# Patient Record
Sex: FEMALE | Race: WHITE | Employment: FULL TIME | URBAN - NONMETROPOLITAN AREA
[De-identification: names, ages, dates, MRNs, and addresses within clinical notes are randomized per-mention and may not be internally consistent; named-entity substitution may affect disease eponyms.]

---

## 2023-08-13 SDOH — HEALTH STABILITY: PHYSICAL HEALTH: ON AVERAGE, HOW MANY DAYS PER WEEK DO YOU ENGAGE IN MODERATE TO STRENUOUS EXERCISE (LIKE A BRISK WALK)?: 0 DAYS

## 2023-08-13 SDOH — HEALTH STABILITY: PHYSICAL HEALTH: ON AVERAGE, HOW MANY MINUTES DO YOU ENGAGE IN EXERCISE AT THIS LEVEL?: 0 MIN

## 2023-08-14 ENCOUNTER — OFFICE VISIT (OUTPATIENT)
Dept: FAMILY MEDICINE CLINIC | Age: 58
End: 2023-08-14
Payer: COMMERCIAL

## 2023-08-14 VITALS
OXYGEN SATURATION: 96 % | HEIGHT: 68 IN | HEART RATE: 86 BPM | SYSTOLIC BLOOD PRESSURE: 96 MMHG | BODY MASS INDEX: 24.16 KG/M2 | WEIGHT: 159.4 LBS | DIASTOLIC BLOOD PRESSURE: 64 MMHG

## 2023-08-14 DIAGNOSIS — Z76.89 ENCOUNTER TO ESTABLISH CARE: ICD-10-CM

## 2023-08-14 DIAGNOSIS — J30.9 ALLERGIC RHINITIS, UNSPECIFIED SEASONALITY, UNSPECIFIED TRIGGER: ICD-10-CM

## 2023-08-14 DIAGNOSIS — Z13.220 SCREENING FOR HYPERLIPIDEMIA: ICD-10-CM

## 2023-08-14 DIAGNOSIS — Z00.00 ENCOUNTER FOR WELLNESS EXAMINATION IN ADULT: Primary | ICD-10-CM

## 2023-08-14 DIAGNOSIS — Z12.31 ENCOUNTER FOR SCREENING MAMMOGRAM FOR BREAST CANCER: ICD-10-CM

## 2023-08-14 PROCEDURE — 3017F COLORECTAL CA SCREEN DOC REV: CPT | Performed by: NURSE PRACTITIONER

## 2023-08-14 PROCEDURE — G8420 CALC BMI NORM PARAMETERS: HCPCS | Performed by: NURSE PRACTITIONER

## 2023-08-14 PROCEDURE — G8427 DOCREV CUR MEDS BY ELIG CLIN: HCPCS | Performed by: NURSE PRACTITIONER

## 2023-08-14 PROCEDURE — 99204 OFFICE O/P NEW MOD 45 MIN: CPT | Performed by: NURSE PRACTITIONER

## 2023-08-14 PROCEDURE — 1036F TOBACCO NON-USER: CPT | Performed by: NURSE PRACTITIONER

## 2023-08-14 RX ORDER — DIPHENHYDRAMINE HCL 25 MG
CAPSULE ORAL
COMMUNITY

## 2023-08-14 RX ORDER — CLOBETASOL PROPIONATE 0.5 MG/G
CREAM TOPICAL 2 TIMES DAILY
COMMUNITY
Start: 2021-11-26

## 2023-08-14 RX ORDER — LORATADINE 10 MG/1
10 TABLET ORAL DAILY
COMMUNITY

## 2023-08-14 RX ORDER — PHENOL 1.4 %
10 AEROSOL, SPRAY (ML) MUCOUS MEMBRANE NIGHTLY
COMMUNITY

## 2023-08-14 SDOH — ECONOMIC STABILITY: HOUSING INSECURITY
IN THE LAST 12 MONTHS, WAS THERE A TIME WHEN YOU DID NOT HAVE A STEADY PLACE TO SLEEP OR SLEPT IN A SHELTER (INCLUDING NOW)?: NO

## 2023-08-14 SDOH — ECONOMIC STABILITY: FOOD INSECURITY: WITHIN THE PAST 12 MONTHS, THE FOOD YOU BOUGHT JUST DIDN'T LAST AND YOU DIDN'T HAVE MONEY TO GET MORE.: NEVER TRUE

## 2023-08-14 SDOH — ECONOMIC STABILITY: INCOME INSECURITY: HOW HARD IS IT FOR YOU TO PAY FOR THE VERY BASICS LIKE FOOD, HOUSING, MEDICAL CARE, AND HEATING?: NOT HARD AT ALL

## 2023-08-14 SDOH — ECONOMIC STABILITY: FOOD INSECURITY: WITHIN THE PAST 12 MONTHS, YOU WORRIED THAT YOUR FOOD WOULD RUN OUT BEFORE YOU GOT MONEY TO BUY MORE.: NEVER TRUE

## 2023-08-14 ASSESSMENT — ENCOUNTER SYMPTOMS
ABDOMINAL PAIN: 0
COUGH: 0
WHEEZING: 0
DIARRHEA: 0
CONSTIPATION: 0
SHORTNESS OF BREATH: 0
NAUSEA: 0
EYES NEGATIVE: 1

## 2023-08-14 ASSESSMENT — PATIENT HEALTH QUESTIONNAIRE - PHQ9
SUM OF ALL RESPONSES TO PHQ QUESTIONS 1-9: 0
2. FEELING DOWN, DEPRESSED OR HOPELESS: 0
SUM OF ALL RESPONSES TO PHQ QUESTIONS 1-9: 0
1. LITTLE INTEREST OR PLEASURE IN DOING THINGS: 0
SUM OF ALL RESPONSES TO PHQ QUESTIONS 1-9: 0
SUM OF ALL RESPONSES TO PHQ QUESTIONS 1-9: 0
SUM OF ALL RESPONSES TO PHQ9 QUESTIONS 1 & 2: 0

## 2023-08-14 NOTE — PROGRESS NOTES
[address]  [address], [address], [address]  Dept: [phone]  Dept Fax: [phone]    Date of Service:  8/14/2023    Apryl Merritt is a 66 y.o. female who presents in office today with Self. Chief Complaint   Patient presents with    New Patient     Former pt of Alonso Canales        Diagnoses / Plan:   1. Encounter for wellness examination in adult  -     CBC with Auto Differential; Future  -     Lipid, Fasting; Future  -     Comprehensive Metabolic Panel; Future  -     HUBER DIGITAL SCREEN W OR WO CAD BILATERAL; Future  2. Encounter to establish care  3. Allergic rhinitis, unspecified seasonality, unspecified trigger  Assessment & Plan:   Well-controlled, continue current medications. Doing well with Claritin. Orders:  -     CBC with Auto Differential; Future  -     Comprehensive Metabolic Panel; Future  4. Screening for hyperlipidemia  -     Lipid, Fasting; Future  -     Comprehensive Metabolic Panel; Future  5. Encounter for screening mammogram for breast cancer  -     HUBER DIGITAL SCREEN W OR WO CAD BILATERAL; Future       Encouraged healthy diet and routine exercise. Instructed to continue current medications. All patient questions answered. Patient voiced understanding. Return if symptoms worsen or fail to improve. Subjective:   History of Present Illness:  Presents to establish care. She works full time at [employer]. She is a non smoker, does not exercise, no prescription medication. Blood pressure usually runs low. She denies fatigue, lightheadedness or dizziness. Completes monthly self breast exams. Reports having multiple mammograms and should be up to date. Colonoscopy completed at [facility]. Has pain to the right shoulder, follows with orthopedic surgery (Dr. Oswaldo Ricci) in [place].         BP Readings from Last 3 Encounters:   08/14/23 96/64       Wt Readings from Last 3 Encounters:   08/14/23 159 lb 6.4 oz (72.3 kg)        The ASCVD Risk

## 2023-08-18 ENCOUNTER — TELEPHONE (OUTPATIENT)
Age: 58
End: 2023-08-18

## 2023-08-18 DIAGNOSIS — Z01.818 PRE-OP EXAM: Primary | ICD-10-CM

## 2023-08-18 NOTE — TELEPHONE ENCOUNTER
Lisa Shabazz has been scheduled for a right shoulder scope rotator cuff repair and possible subscapularis repair on 9/6. Her PCP ordered some of the bloodwork we need so I just added an EKG. I gave her the instructions when she was in the office yesterday, she can do this as a walk in. Consent has been signed.

## 2023-08-23 ENCOUNTER — HOSPITAL ENCOUNTER (OUTPATIENT)
Age: 58
Discharge: HOME | End: 2023-08-23
Payer: COMMERCIAL

## 2023-08-23 PROCEDURE — 93005 ELECTROCARDIOGRAM TRACING: CPT | Performed by: ORTHOPAEDIC SURGERY

## 2023-08-24 LAB
EKG ATRIAL RATE: 62 BPM
EKG P AXIS: 54 DEGREES
EKG P-R INTERVAL: 122 MS
EKG Q-T INTERVAL: 428 MS
EKG QRS DURATION: 76 MS
EKG QTC CALCULATION (BAZETT): 434 MS
EKG R AXIS: 9 DEGREES
EKG T AXIS: 43 DEGREES
EKG VENTRICULAR RATE: 62 BPM

## 2023-08-29 ENCOUNTER — HOSPITAL ENCOUNTER (OUTPATIENT)
Age: 58
Discharge: HOME | End: 2023-08-29
Payer: COMMERCIAL

## 2023-08-29 ENCOUNTER — HOSPITAL ENCOUNTER (OUTPATIENT)
Dept: MAMMOGRAPHY | Age: 58
Discharge: HOME | End: 2023-08-31
Payer: COMMERCIAL

## 2023-08-29 VITALS — BODY MASS INDEX: 24.96 KG/M2 | HEIGHT: 67 IN | WEIGHT: 159 LBS

## 2023-08-29 DIAGNOSIS — Z00.00 ENCOUNTER FOR WELLNESS EXAMINATION IN ADULT: ICD-10-CM

## 2023-08-29 DIAGNOSIS — Z12.31 ENCOUNTER FOR SCREENING MAMMOGRAM FOR BREAST CANCER: ICD-10-CM

## 2023-08-29 DIAGNOSIS — J30.9 ALLERGIC RHINITIS, UNSPECIFIED SEASONALITY, UNSPECIFIED TRIGGER: ICD-10-CM

## 2023-08-29 DIAGNOSIS — Z13.220 SCREENING FOR HYPERLIPIDEMIA: ICD-10-CM

## 2023-08-29 LAB
ALBUMIN SERPL-MCNC: 4.4 G/DL (ref 3.5–5.2)
ALBUMIN/GLOB SERPL: 1.5 {RATIO} (ref 1–2.5)
ALP SERPL-CCNC: 93 U/L (ref 35–104)
ALT SERPL-CCNC: 15 U/L (ref 5–33)
ANION GAP SERPL CALCULATED.3IONS-SCNC: 11 MMOL/L (ref 9–17)
AST SERPL-CCNC: 21 U/L
BASOPHILS # BLD: 0.05 K/UL (ref 0–0.2)
BASOPHILS NFR BLD: 1 % (ref 0–2)
BILIRUB SERPL-MCNC: 0.4 MG/DL (ref 0.3–1.2)
BUN SERPL-MCNC: 18 MG/DL (ref 6–20)
CALCIUM SERPL-MCNC: 10.6 MG/DL (ref 8.6–10.4)
CHLORIDE SERPL-SCNC: 105 MMOL/L (ref 98–107)
CHOLEST SERPL-MCNC: 212 MG/DL
CHOLESTEROL/HDL RATIO: 3.4
CO2 SERPL-SCNC: 25 MMOL/L (ref 20–31)
CREAT SERPL-MCNC: 1 MG/DL (ref 0.5–0.9)
EOSINOPHIL # BLD: 0.46 K/UL (ref 0–0.44)
EOSINOPHILS RELATIVE PERCENT: 6 % (ref 1–4)
ERYTHROCYTE [DISTWIDTH] IN BLOOD BY AUTOMATED COUNT: 12.1 % (ref 11.8–14.4)
GFR SERPL CREATININE-BSD FRML MDRD: >60 ML/MIN/1.73M2
GLUCOSE SERPL-MCNC: 93 MG/DL (ref 70–99)
HCT VFR BLD AUTO: 45.1 % (ref 36.3–47.1)
HDLC SERPL-MCNC: 63 MG/DL
HGB BLD-MCNC: 14.5 G/DL (ref 11.9–15.1)
IMM GRANULOCYTES # BLD AUTO: <0.03 K/UL (ref 0–0.3)
IMM GRANULOCYTES NFR BLD: 0 %
LDLC SERPL CALC-MCNC: 121 MG/DL (ref 0–130)
LYMPHOCYTES NFR BLD: 1.84 K/UL (ref 1.1–3.7)
LYMPHOCYTES RELATIVE PERCENT: 25 % (ref 24–43)
MCH RBC QN AUTO: 30.4 PG (ref 25.2–33.5)
MCHC RBC AUTO-ENTMCNC: 32.2 G/DL (ref 28.4–34.8)
MCV RBC AUTO: 94.5 FL (ref 82.6–102.9)
MONOCYTES NFR BLD: 0.52 K/UL (ref 0.1–1.2)
MONOCYTES NFR BLD: 7 % (ref 3–12)
NEUTROPHILS NFR BLD: 61 % (ref 36–65)
NEUTS SEG NFR BLD: 4.36 K/UL (ref 1.5–8.1)
NRBC BLD-RTO: 0 PER 100 WBC
PLATELET # BLD AUTO: 286 K/UL (ref 138–453)
PMV BLD AUTO: 10.4 FL (ref 8.1–13.5)
POTASSIUM SERPL-SCNC: 4.2 MMOL/L (ref 3.7–5.3)
PROT SERPL-MCNC: 7.3 G/DL (ref 6.4–8.3)
RBC # BLD AUTO: 4.77 M/UL (ref 3.95–5.11)
SODIUM SERPL-SCNC: 141 MMOL/L (ref 135–144)
TRIGL SERPL-MCNC: 138 MG/DL
WBC OTHER # BLD: 7.2 K/UL (ref 3.5–11.3)

## 2023-08-29 PROCEDURE — 77063 BREAST TOMOSYNTHESIS BI: CPT

## 2023-08-29 PROCEDURE — 80053 COMPREHEN METABOLIC PANEL: CPT

## 2023-08-29 PROCEDURE — 85025 COMPLETE CBC W/AUTO DIFF WBC: CPT

## 2023-08-29 PROCEDURE — 36415 COLL VENOUS BLD VENIPUNCTURE: CPT

## 2023-08-29 PROCEDURE — 80061 LIPID PANEL: CPT

## 2023-08-29 NOTE — PROGRESS NOTES
DAY OF SURGERY/PROCEDURE  GUIDELINES    As a patient at the [facility], you can expect quality medical and nursing care that is centered on your individual needs. It is our goal to make your surgical experience as comfortable and excellent as possible.  ________________________________________________________________________    The following instructions are general guidelines, if any information on this sheet is different from what your doctor has instructed you to do, please follow your doctor's instructions. Please arrive on 09/06/2023 @  0715      Enter through entrance C. Check in at registration     Upon arrival you will be taken to the pre-operative area to get ready for surgery, your family will stay in the waiting room and visit with you once you are ready for surgery. Due to special limitations please limit visitation to 1-2 members of your family at a time. When it is time for surgery your family will return to the waiting room. Nothing to eat, drink, smoke, suck or chew after midnight (no water, gum, mints, cigarettes, cigars, pipes, snuff, chewing tobacco, etc.) or your surgery may be canceled. Take a shower or bath on the morning of your surgery/procedure (Hibiclens if directed) Do not apply any lotions. Brush your teeth, but do not swallow any water    IN CASE OF ILLNESS - If you have a cold or flu symptoms (high fever, runny nose, sore throat, cough, etc.) rash, nausea, vomiting, loose stools, and/or recent contact with someone who has a contagious disease (chick pox, measles, etc.) please call your doctor before coming to the surgery center    Take a small sip of water with heart, blood pressure, and/or seizure medication the morning of surgery.      If applicable bring your:  Inhaler (s)  Hearing aid(s)  Eyeglasses and Case (If you wear contacts they have to be removed before surgery, bring case and solution)  CPAP     DO NOT take anticoagulants (blood thinners,

## 2023-08-30 DIAGNOSIS — E83.52 HYPERCALCEMIA: Primary | ICD-10-CM

## 2023-08-30 NOTE — RESULT ENCOUNTER NOTE
Your recent mammogram result is normal.  Please call the office or message through [address] [place] [address] with any additional questions or concerns.

## 2023-09-05 ENCOUNTER — ANESTHESIA EVENT (OUTPATIENT)
Dept: OPERATING ROOM | Age: 58
End: 2023-09-05
Payer: COMMERCIAL

## 2023-09-06 ENCOUNTER — HOSPITAL ENCOUNTER (OUTPATIENT)
Age: 58
Setting detail: OUTPATIENT SURGERY
Discharge: HOME | End: 2023-09-06
Attending: ORTHOPAEDIC SURGERY | Admitting: ORTHOPAEDIC SURGERY
Payer: COMMERCIAL

## 2023-09-06 ENCOUNTER — ANESTHESIA (OUTPATIENT)
Dept: OPERATING ROOM | Age: 58
End: 2023-09-06
Payer: COMMERCIAL

## 2023-09-06 VITALS
HEART RATE: 83 BPM | HEIGHT: 68 IN | TEMPERATURE: 96.4 F | RESPIRATION RATE: 16 BRPM | BODY MASS INDEX: 23.76 KG/M2 | OXYGEN SATURATION: 95 % | WEIGHT: 156.8 LBS | SYSTOLIC BLOOD PRESSURE: 111 MMHG | DIASTOLIC BLOOD PRESSURE: 68 MMHG

## 2023-09-06 DIAGNOSIS — G89.18 POST-OP PAIN: Primary | ICD-10-CM

## 2023-09-06 PROCEDURE — 6360000002 HC RX W HCPCS: Performed by: NURSE ANESTHETIST, CERTIFIED REGISTERED

## 2023-09-06 PROCEDURE — 6360000002 HC RX W HCPCS: Performed by: ORTHOPAEDIC SURGERY

## 2023-09-06 PROCEDURE — 2580000003 HC RX 258: Performed by: ORTHOPAEDIC SURGERY

## 2023-09-06 PROCEDURE — 7100000000 HC PACU RECOVERY - FIRST 15 MIN: Performed by: ORTHOPAEDIC SURGERY

## 2023-09-06 PROCEDURE — 3700000000 HC ANESTHESIA ATTENDED CARE: Performed by: ORTHOPAEDIC SURGERY

## 2023-09-06 PROCEDURE — 29822 SHO ARTHRS SRG LMTD DBRDMT: CPT | Performed by: ORTHOPAEDIC SURGERY

## 2023-09-06 PROCEDURE — 3600000004 HC SURGERY LEVEL 4 BASE: Performed by: ORTHOPAEDIC SURGERY

## 2023-09-06 PROCEDURE — 2500000003 HC RX 250 WO HCPCS: Performed by: NURSE ANESTHETIST, CERTIFIED REGISTERED

## 2023-09-06 PROCEDURE — 2580000003 HC RX 258: Performed by: ANESTHESIOLOGY

## 2023-09-06 PROCEDURE — 3700000001 HC ADD 15 MINUTES (ANESTHESIA): Performed by: ORTHOPAEDIC SURGERY

## 2023-09-06 PROCEDURE — 7100000001 HC PACU RECOVERY - ADDTL 15 MIN: Performed by: ORTHOPAEDIC SURGERY

## 2023-09-06 PROCEDURE — 6370000000 HC RX 637 (ALT 250 FOR IP)

## 2023-09-06 PROCEDURE — C9290 INJ, BUPIVACAINE LIPOSOME: HCPCS | Performed by: ANESTHESIOLOGY

## 2023-09-06 PROCEDURE — C1713 ANCHOR/SCREW BN/BN,TIS/BN: HCPCS | Performed by: ORTHOPAEDIC SURGERY

## 2023-09-06 PROCEDURE — L3650 SO 8 ABD RESTRAINT PRE OTS: HCPCS | Performed by: ORTHOPAEDIC SURGERY

## 2023-09-06 PROCEDURE — 64415 NJX AA&/STRD BRCH PLXS IMG: CPT | Performed by: ANESTHESIOLOGY

## 2023-09-06 PROCEDURE — C9399 UNCLASSIFIED DRUGS OR BIOLOG: HCPCS

## 2023-09-06 PROCEDURE — 7100000011 HC PHASE II RECOVERY - ADDTL 15 MIN: Performed by: ORTHOPAEDIC SURGERY

## 2023-09-06 PROCEDURE — 6360000002 HC RX W HCPCS

## 2023-09-06 PROCEDURE — 2720000010 HC SURG SUPPLY STERILE: Performed by: ORTHOPAEDIC SURGERY

## 2023-09-06 PROCEDURE — 29827 SHO ARTHRS SRG RT8TR CUF RPR: CPT | Performed by: ORTHOPAEDIC SURGERY

## 2023-09-06 PROCEDURE — 2709999900 HC NON-CHARGEABLE SUPPLY: Performed by: ORTHOPAEDIC SURGERY

## 2023-09-06 PROCEDURE — 7100000010 HC PHASE II RECOVERY - FIRST 15 MIN: Performed by: ORTHOPAEDIC SURGERY

## 2023-09-06 PROCEDURE — 6360000002 HC RX W HCPCS: Performed by: ANESTHESIOLOGY

## 2023-09-06 PROCEDURE — 3600000014 HC SURGERY LEVEL 4 ADDTL 15MIN: Performed by: ORTHOPAEDIC SURGERY

## 2023-09-06 DEVICE — ANCHOR SUT L24.5MM DIA4.75MM BIOCOMPOSITE SELF PUNCHING: Type: IMPLANTABLE DEVICE | Site: SHOULDER | Status: FUNCTIONAL

## 2023-09-06 RX ORDER — ONDANSETRON 2 MG/ML
INJECTION INTRAMUSCULAR; INTRAVENOUS PRN
Status: DISCONTINUED | OUTPATIENT
Start: 2023-09-06 | End: 2023-09-06 | Stop reason: SDUPTHER

## 2023-09-06 RX ORDER — BUPIVACAINE HYDROCHLORIDE 2.5 MG/ML
INJECTION, SOLUTION EPIDURAL; INFILTRATION; INTRACAUDAL
Status: COMPLETED | OUTPATIENT
Start: 2023-09-06 | End: 2023-09-06

## 2023-09-06 RX ORDER — OXYCODONE HYDROCHLORIDE 5 MG/1
10 TABLET ORAL PRN
Status: DISCONTINUED | OUTPATIENT
Start: 2023-09-06 | End: 2023-09-06 | Stop reason: HOSPADM

## 2023-09-06 RX ORDER — SODIUM CHLORIDE 0.9 % (FLUSH) 0.9 %
5-40 SYRINGE (ML) INJECTION PRN
Status: DISCONTINUED | OUTPATIENT
Start: 2023-09-06 | End: 2023-09-06 | Stop reason: HOSPADM

## 2023-09-06 RX ORDER — EPHEDRINE SULFATE/0.9% NACL/PF 50 MG/5 ML
SYRINGE (ML) INTRAVENOUS PRN
Status: DISCONTINUED | OUTPATIENT
Start: 2023-09-06 | End: 2023-09-06 | Stop reason: SDUPTHER

## 2023-09-06 RX ORDER — CEFAZOLIN 2 G/1
INJECTION, POWDER, FOR SOLUTION INTRAMUSCULAR; INTRAVENOUS
Status: DISCONTINUED
Start: 2023-09-06 | End: 2023-09-06 | Stop reason: HOSPADM

## 2023-09-06 RX ORDER — MIDAZOLAM HYDROCHLORIDE 2 MG/2ML
2 INJECTION, SOLUTION INTRAMUSCULAR; INTRAVENOUS
Status: DISCONTINUED | OUTPATIENT
Start: 2023-09-06 | End: 2023-09-06 | Stop reason: HOSPADM

## 2023-09-06 RX ORDER — MEPERIDINE HYDROCHLORIDE 50 MG/ML
12.5 INJECTION INTRAMUSCULAR; INTRAVENOUS; SUBCUTANEOUS ONCE
Status: DISCONTINUED | OUTPATIENT
Start: 2023-09-06 | End: 2023-09-06 | Stop reason: HOSPADM

## 2023-09-06 RX ORDER — SODIUM CHLORIDE 0.9 % (FLUSH) 0.9 %
5-40 SYRINGE (ML) INJECTION EVERY 12 HOURS SCHEDULED
Status: DISCONTINUED | OUTPATIENT
Start: 2023-09-06 | End: 2023-09-06 | Stop reason: HOSPADM

## 2023-09-06 RX ORDER — OXYCODONE HYDROCHLORIDE 5 MG/1
5 TABLET ORAL PRN
Status: DISCONTINUED | OUTPATIENT
Start: 2023-09-06 | End: 2023-09-06 | Stop reason: HOSPADM

## 2023-09-06 RX ORDER — LIDOCAINE HYDROCHLORIDE 10 MG/ML
INJECTION, SOLUTION INFILTRATION; PERINEURAL PRN
Status: DISCONTINUED | OUTPATIENT
Start: 2023-09-06 | End: 2023-09-06 | Stop reason: SDUPTHER

## 2023-09-06 RX ORDER — ROCURONIUM BROMIDE 10 MG/ML
INJECTION, SOLUTION INTRAVENOUS PRN
Status: DISCONTINUED | OUTPATIENT
Start: 2023-09-06 | End: 2023-09-06 | Stop reason: SDUPTHER

## 2023-09-06 RX ORDER — MIDAZOLAM HYDROCHLORIDE 2 MG/2ML
2 INJECTION, SOLUTION INTRAMUSCULAR; INTRAVENOUS ONCE
Status: COMPLETED | OUTPATIENT
Start: 2023-09-06 | End: 2023-09-06

## 2023-09-06 RX ORDER — MIDAZOLAM HYDROCHLORIDE 1 MG/ML
INJECTION INTRAMUSCULAR; INTRAVENOUS
Status: COMPLETED
Start: 2023-09-06 | End: 2023-09-06

## 2023-09-06 RX ORDER — PROPOFOL 10 MG/ML
INJECTION, EMULSION INTRAVENOUS PRN
Status: DISCONTINUED | OUTPATIENT
Start: 2023-09-06 | End: 2023-09-06 | Stop reason: SDUPTHER

## 2023-09-06 RX ORDER — BUPIVACAINE HYDROCHLORIDE 2.5 MG/ML
INJECTION, SOLUTION EPIDURAL; INFILTRATION; INTRACAUDAL
Status: COMPLETED
Start: 2023-09-06 | End: 2023-09-06

## 2023-09-06 RX ORDER — LABETALOL HYDROCHLORIDE 5 MG/ML
10 INJECTION, SOLUTION INTRAVENOUS
Status: DISCONTINUED | OUTPATIENT
Start: 2023-09-06 | End: 2023-09-06 | Stop reason: HOSPADM

## 2023-09-06 RX ORDER — FENTANYL CITRATE 50 UG/ML
INJECTION, SOLUTION INTRAMUSCULAR; INTRAVENOUS PRN
Status: DISCONTINUED | OUTPATIENT
Start: 2023-09-06 | End: 2023-09-06 | Stop reason: SDUPTHER

## 2023-09-06 RX ORDER — DEXAMETHASONE SODIUM PHOSPHATE 10 MG/ML
INJECTION, SOLUTION INTRAMUSCULAR; INTRAVENOUS PRN
Status: DISCONTINUED | OUTPATIENT
Start: 2023-09-06 | End: 2023-09-06 | Stop reason: SDUPTHER

## 2023-09-06 RX ORDER — METOCLOPRAMIDE HYDROCHLORIDE 5 MG/ML
10 INJECTION INTRAMUSCULAR; INTRAVENOUS
Status: DISCONTINUED | OUTPATIENT
Start: 2023-09-06 | End: 2023-09-06 | Stop reason: HOSPADM

## 2023-09-06 RX ORDER — DEXAMETHASONE SODIUM PHOSPHATE 10 MG/ML
10 INJECTION, SOLUTION INTRAMUSCULAR; INTRAVENOUS ONCE
Status: DISCONTINUED | OUTPATIENT
Start: 2023-09-06 | End: 2023-09-06 | Stop reason: HOSPADM

## 2023-09-06 RX ORDER — SODIUM CHLORIDE, SODIUM LACTATE, POTASSIUM CHLORIDE, CALCIUM CHLORIDE 600; 310; 30; 20 MG/100ML; MG/100ML; MG/100ML; MG/100ML
INJECTION, SOLUTION INTRAVENOUS CONTINUOUS
Status: DISCONTINUED | OUTPATIENT
Start: 2023-09-06 | End: 2023-09-06 | Stop reason: HOSPADM

## 2023-09-06 RX ORDER — HYDRALAZINE HYDROCHLORIDE 20 MG/ML
10 INJECTION INTRAMUSCULAR; INTRAVENOUS
Status: DISCONTINUED | OUTPATIENT
Start: 2023-09-06 | End: 2023-09-06 | Stop reason: HOSPADM

## 2023-09-06 RX ORDER — PHENYLEPHRINE HCL IN 0.9% NACL 1 MG/10 ML
SYRINGE (ML) INTRAVENOUS PRN
Status: DISCONTINUED | OUTPATIENT
Start: 2023-09-06 | End: 2023-09-06 | Stop reason: SDUPTHER

## 2023-09-06 RX ORDER — FENTANYL CITRATE 50 UG/ML
100 INJECTION, SOLUTION INTRAMUSCULAR; INTRAVENOUS ONCE
Status: COMPLETED | OUTPATIENT
Start: 2023-09-06 | End: 2023-09-06

## 2023-09-06 RX ORDER — SODIUM CHLORIDE 9 MG/ML
INJECTION, SOLUTION INTRAVENOUS PRN
Status: DISCONTINUED | OUTPATIENT
Start: 2023-09-06 | End: 2023-09-06 | Stop reason: HOSPADM

## 2023-09-06 RX ORDER — IBUPROFEN 800 MG/1
800 TABLET ORAL
Qty: 90 TABLET | Refills: 0 | Status: SHIPPED | OUTPATIENT
Start: 2023-09-06

## 2023-09-06 RX ORDER — SODIUM CHLORIDE 9 MG/ML
INJECTION INTRAVENOUS
Status: DISCONTINUED
Start: 2023-09-06 | End: 2023-09-06 | Stop reason: HOSPADM

## 2023-09-06 RX ORDER — ACETAMINOPHEN 500 MG
TABLET ORAL
Status: COMPLETED
Start: 2023-09-06 | End: 2023-09-06

## 2023-09-06 RX ORDER — DIPHENHYDRAMINE HYDROCHLORIDE 50 MG/ML
12.5 INJECTION INTRAMUSCULAR; INTRAVENOUS
Status: DISCONTINUED | OUTPATIENT
Start: 2023-09-06 | End: 2023-09-06 | Stop reason: HOSPADM

## 2023-09-06 RX ORDER — OXYCODONE HYDROCHLORIDE AND ACETAMINOPHEN 5; 325 MG/1; MG/1
1-2 TABLET ORAL EVERY 6 HOURS PRN
Qty: 40 TABLET | Refills: 0 | Status: SHIPPED | OUTPATIENT
Start: 2023-09-06 | End: 2023-09-13

## 2023-09-06 RX ORDER — KETOROLAC TROMETHAMINE 30 MG/ML
INJECTION, SOLUTION INTRAMUSCULAR; INTRAVENOUS PRN
Status: DISCONTINUED | OUTPATIENT
Start: 2023-09-06 | End: 2023-09-06 | Stop reason: SDUPTHER

## 2023-09-06 RX ORDER — ONDANSETRON 2 MG/ML
4 INJECTION INTRAMUSCULAR; INTRAVENOUS
Status: DISCONTINUED | OUTPATIENT
Start: 2023-09-06 | End: 2023-09-06 | Stop reason: HOSPADM

## 2023-09-06 RX ORDER — FENTANYL CITRATE 50 UG/ML
INJECTION, SOLUTION INTRAMUSCULAR; INTRAVENOUS
Status: COMPLETED
Start: 2023-09-06 | End: 2023-09-06

## 2023-09-06 RX ORDER — MORPHINE SULFATE 2 MG/ML
1 INJECTION, SOLUTION INTRAMUSCULAR; INTRAVENOUS EVERY 5 MIN PRN
Status: DISCONTINUED | OUTPATIENT
Start: 2023-09-06 | End: 2023-09-06 | Stop reason: HOSPADM

## 2023-09-06 RX ORDER — GLYCOPYRROLATE 0.2 MG/ML
INJECTION INTRAMUSCULAR; INTRAVENOUS PRN
Status: DISCONTINUED | OUTPATIENT
Start: 2023-09-06 | End: 2023-09-06 | Stop reason: SDUPTHER

## 2023-09-06 RX ORDER — ACETAMINOPHEN 500 MG
1000 TABLET ORAL ONCE
Status: COMPLETED | OUTPATIENT
Start: 2023-09-06 | End: 2023-09-06

## 2023-09-06 RX ADMIN — BUPIVACAINE 10 ML: 13.3 INJECTION, SUSPENSION, LIPOSOMAL INFILTRATION at 08:03

## 2023-09-06 RX ADMIN — Medication 10 MG: at 10:24

## 2023-09-06 RX ADMIN — DEXAMETHASONE SODIUM PHOSPHATE 10 MG: 10 INJECTION, SOLUTION INTRAMUSCULAR; INTRAVENOUS at 09:13

## 2023-09-06 RX ADMIN — KETOROLAC TROMETHAMINE 30 MG: 30 INJECTION, SOLUTION INTRAMUSCULAR; INTRAVENOUS at 10:15

## 2023-09-06 RX ADMIN — LIDOCAINE HYDROCHLORIDE 50 MG: 10 INJECTION, SOLUTION INFILTRATION; PERINEURAL at 09:09

## 2023-09-06 RX ADMIN — MIDAZOLAM HYDROCHLORIDE 2 MG: 2 INJECTION, SOLUTION INTRAMUSCULAR; INTRAVENOUS at 08:03

## 2023-09-06 RX ADMIN — FENTANYL CITRATE 50 MCG: 50 INJECTION, SOLUTION INTRAMUSCULAR; INTRAVENOUS at 09:59

## 2023-09-06 RX ADMIN — BUPIVACAINE HYDROCHLORIDE 10 ML: 2.5 INJECTION, SOLUTION EPIDURAL; INFILTRATION; INTRACAUDAL; PERINEURAL at 08:03

## 2023-09-06 RX ADMIN — Medication 20 MG: at 09:53

## 2023-09-06 RX ADMIN — ROCURONIUM BROMIDE 50 MG: 10 INJECTION, SOLUTION INTRAVENOUS at 09:09

## 2023-09-06 RX ADMIN — Medication 200 MCG: at 09:27

## 2023-09-06 RX ADMIN — MIDAZOLAM HYDROCHLORIDE 2 MG: 1 INJECTION, SOLUTION INTRAMUSCULAR; INTRAVENOUS at 08:03

## 2023-09-06 RX ADMIN — Medication 100 MCG: at 09:53

## 2023-09-06 RX ADMIN — FENTANYL CITRATE 50 MCG: 50 INJECTION, SOLUTION INTRAMUSCULAR; INTRAVENOUS at 08:03

## 2023-09-06 RX ADMIN — SODIUM CHLORIDE, POTASSIUM CHLORIDE, SODIUM LACTATE AND CALCIUM CHLORIDE: 600; 310; 30; 20 INJECTION, SOLUTION INTRAVENOUS at 07:49

## 2023-09-06 RX ADMIN — ONDANSETRON 4 MG: 2 INJECTION INTRAMUSCULAR; INTRAVENOUS at 10:13

## 2023-09-06 RX ADMIN — ACETAMINOPHEN 1000 MG: 500 TABLET ORAL at 07:54

## 2023-09-06 RX ADMIN — Medication 100 MCG: at 09:43

## 2023-09-06 RX ADMIN — FENTANYL CITRATE 50 MCG: 50 INJECTION, SOLUTION INTRAMUSCULAR; INTRAVENOUS at 09:09

## 2023-09-06 RX ADMIN — Medication 100 MCG: at 10:09

## 2023-09-06 RX ADMIN — Medication 100 MCG: at 09:38

## 2023-09-06 RX ADMIN — GLYCOPYRROLATE 0.4 MG: 0.2 INJECTION INTRAMUSCULAR; INTRAVENOUS at 09:49

## 2023-09-06 RX ADMIN — PROPOFOL 50 MG: 10 INJECTION, EMULSION INTRAVENOUS at 10:03

## 2023-09-06 RX ADMIN — CEFAZOLIN 2000 MG: 2 INJECTION, POWDER, FOR SOLUTION INTRAMUSCULAR; INTRAVENOUS at 09:13

## 2023-09-06 RX ADMIN — Medication 1000 MG: at 07:54

## 2023-09-06 RX ADMIN — SUGAMMADEX 200 MG: 100 INJECTION, SOLUTION INTRAVENOUS at 10:16

## 2023-09-06 RX ADMIN — SODIUM CHLORIDE, POTASSIUM CHLORIDE, SODIUM LACTATE AND CALCIUM CHLORIDE: 600; 310; 30; 20 INJECTION, SOLUTION INTRAVENOUS at 10:13

## 2023-09-06 RX ADMIN — PROPOFOL 150 MG: 10 INJECTION, EMULSION INTRAVENOUS at 09:09

## 2023-09-06 ASSESSMENT — PAIN - FUNCTIONAL ASSESSMENT
PAIN_FUNCTIONAL_ASSESSMENT: 0-10
PAIN_FUNCTIONAL_ASSESSMENT: PREVENTS OR INTERFERES SOME ACTIVE ACTIVITIES AND ADLS

## 2023-09-06 ASSESSMENT — PAIN DESCRIPTION - DESCRIPTORS: DESCRIPTORS: ACHING

## 2023-09-06 NOTE — ANESTHESIA POSTPROCEDURE EVALUATION
Department of Anesthesiology  Postprocedure Note    Patient: Isidoro Liz  MRN: [id number]  YOB: 1965  Date of evaluation: 9/6/2023      Procedure Summary     Date: 09/06/23 Room / Location: [address]) [facility]    Anesthesia Start: 1661 Anesthesia Stop: 2265    Procedure: RIGHT SHOULDER ARTHROSCOPY ROTATOR CUFF REPAIR,  LABRAL DEBRIDEMENT, AND SUBACROMIAL DECOMPRESSION WITH ARTHREX AND PRE-OP REGIONAL BLOCK (Right: Shoulder) Diagnosis:       Tear of right rotator cuff, unspecified tear extent, unspecified whether traumatic      Other specified disorders of tendon, right shoulder      (Tear of right rotator cuff, unspecified tear extent, unspecified whether traumatic [M75.101])      (Other specified disorders of tendon, right shoulder [D31.499])    Surgeons: Jerson Kay MD Responsible Provider: Melissa Velazquez MD    Anesthesia Type: general ASA Status: 1          Anesthesia Type: No value filed.     Sourav Phase I: Sourav Score: 10    Sourav Phase II: Sourav Score: 10      Anesthesia Post Evaluation    Patient location during evaluation: PACU  Patient participation: complete - patient participated  Level of consciousness: awake and alert  Pain score: 0  Airway patency: patent  Nausea & Vomiting: no nausea and no vomiting  Complications: no  Cardiovascular status: blood pressure returned to baseline  Respiratory status: acceptable and room air  Hydration status: euvolemic  Pain management: adequate and satisfactory to patient

## 2023-09-06 NOTE — OP NOTE
Operative Note      Patient: Kristian Doe  YOB: 1965  MRN: [id number]    Date of Procedure: 9/6/2023    Pre-Op Diagnosis Codes:     * Tear of right rotator cuff, unspecified tear extent, unspecified whether traumatic [M75.101]     * Other specified disorders of tendon, right shoulder [M67.813]    Post-Op Diagnosis:  #1 right shoulder supraspinatus tear #2 right shoulder anterior labral tear #3 right shoulder subacromial bursitis       Procedures: Right shoulder arthroscopic supraspinatus repair #2 right shoulder arthroscopic labral debridement and subacromial bursectomy. Surgeon(s):  Rigo Pratt MD    Assistant:   First Assistant: Taurus Cummins RN    Anesthesia: General    Estimated Blood Loss (mL): Minimal    Complications: None    Specimens:   * No specimens in log *    Implants:  Implant Name Type Inv. Item Serial No.  Lot No. LRB No. Used Action   ANCHOR SUT L24.5MM DIA4. 75MM BIOCOMPOSITE SELF PUNCHING - [id number]  ANCHOR SUT L24.5MM DIA4. 75MM BIOCOMPOSITE SELF PUNCHING  [employer] [id number] Right 1 Implanted         Drains: * No LDAs found *    Findings: See below        Detailed Description of Procedure:   Informed consent was obtained. I marked her right shoulder. She was brought back to the operating room after receiving a regional nerve block and general anesthesia was smoothly induced. She was placed in the beachchair position. The right arm was prepped and draped in normal sterile fashion. The timeout is performed. She did receive prophylactic antibiotics. I made a standard posterior viewing portal in the glenohumeral joint. Under spinal needle localization and direct visualization created an anterior working portal in the rotator interval.  I started a diagnostic arthroscopy. I hooked her biceps tendon and pulled it into the joint. This looked pristine. Her subscapularis tendon looked excellent. There was some fraying and tearing of the anterior labrum.

## 2023-09-06 NOTE — ANESTHESIA PROCEDURE NOTES
Peripheral Block    Patient location during procedure: pre-op  Reason for block: post-op pain management and at surgeon's request  Start time: 9/6/2023 8:03 AM  End time: 9/6/2023 8:06 AM  Staffing  Performed: anesthesiologist   Anesthesiologist: Jaja Santana MD  Preanesthetic Checklist  Completed: patient identified, IV checked, site marked, risks and benefits discussed, surgical/procedural consents, equipment checked, pre-op evaluation, timeout performed, anesthesia consent given, oxygen available, monitors applied/VS acknowledged, fire risk safety assessment completed and verbalized and blood product R/B/A discussed and consented  Peripheral Block   Patient position: supine  Prep: ChloraPrep  Provider prep: mask and sterile gloves  Patient monitoring: cardiac monitor, continuous pulse ox, frequent blood pressure checks, IV access, oxygen and responsive to questions  Block type: Brachial plexus  Interscalene  Laterality: right  Injection technique: single-shot  Guidance: transarterial technique  Local infiltration: bupivacaine  Infiltration strength: 0.25 %  Local infiltration: bupivacaine  Dose: 2 mL    Needle   Needle type: insulated echogenic nerve stimulator needle   Needle gauge: 22 G  Needle localization: anatomical landmarks and ultrasound guidance  Needle insertion depth: 2 cm  Test dose: negative  Needle length: 5 cm  Assessment   Injection assessment: negative aspiration for heme, no paresthesia on injection, local visualized surrounding nerve on ultrasound and no intravascular symptoms  Paresthesia pain: none  Slow fractionated injection: yes  Hemodynamics: stable  Outcomes: uncomplicated and patient tolerated procedure well    Medications Administered  bupivacaine (MARCAINE) PF injection 0.25% - Interscalene, Shoulder Right   10 mL - 9/6/2023 8:03:00 AM  bupivacaine liposome (EXPAREL) injection 1.3% - Perineural   10 mL - 9/6/2023 8:03:00 AM

## 2023-09-06 NOTE — DISCHARGE INSTRUCTIONS
Surgical bandage may be removed in 2 days down to the bare skin. You may shower at that time. Then place a dry bandage or bandaid over the incisions daily. Keep the sling on or keep the elbow at the side at all times if the sling is taken off. Wear the sling while you sleep. No raising the arm overhead or out away from the body. No lifting with the operative arm. Place ice on the shoulder for 20-30 minutes throughout the day for pain and swelling control. Activity  You have had anesthesia today  Do not drive, operate heavy equipment, consume alcoholic beverages, or make any important decisions  for 24 hours   If you are taking pain medication: Do not drive or consume alcohol. Take your time changing positions today. You may feel light headed or dizzy if you move too quickly. Continue your home medications as ordered by your physician. Diet   You can eat your normal diet when you feel well. You should start off with bland foods like chicken soup, toast, or yogurt. Then advance as tolerated. Drink plenty of fluids (unless your doctor tells you not to). Your urine should be very lightly colored without a strong odor.

## 2023-09-19 ENCOUNTER — OFFICE VISIT (OUTPATIENT)
Age: 58
End: 2023-09-19

## 2023-09-19 VITALS — WEIGHT: 156 LBS | BODY MASS INDEX: 23.64 KG/M2 | HEIGHT: 68 IN

## 2023-09-19 DIAGNOSIS — M75.121 COMPLETE TEAR OF RIGHT ROTATOR CUFF, UNSPECIFIED WHETHER TRAUMATIC: Primary | ICD-10-CM

## 2023-09-19 PROCEDURE — 99024 POSTOP FOLLOW-UP VISIT: CPT | Performed by: ORTHOPAEDIC SURGERY

## 2023-09-19 NOTE — PROGRESS NOTES
[address] [facility]  [address] [place] [address] [facility] [address]  [facility] [address]  Dept: [phone]  Dept Fax: [phone]    Chief Compliant:  Chief Complaint   Patient presents with    Shana Sandy is here today for her first post op shoulder scope. 9/6/23. History of Present Illness: This is a pleasant 66 y.o. female who is now 2 weeks status post right shoulder scope, rotator cuff repair. She is doing well. She is taking ibuprofen. She is taking Percocet approximately once daily at night. Physical Exam: The right shoulder incisions are well-healed. She had a small suture tail which was removed today. She can do pendulums and shoulder shrugs easily as well as elbow range of motion. Imaging: None      Assessment and Plan: This is a pleasant 66 y.o. female who is status post the above and doing as expected. I will start her in physical therapy. At her request she wants to go to the therapy office in [place]. She was given a prescription today. Passive and active assisted range of motion only for the next 4 weeks. Work note that she can return to work today no lifting more than 1 pound with the right arm and use the sling at work as needed for comfort for the next 4 weeks. I will see her in 4 weeks.          Past History:    Current Outpatient Medications:     ibuprofen (ADVIL;MOTRIN) 800 MG tablet, Take 1 tablet by mouth 3 times daily (with meals), Disp: 90 tablet, Rfl: 0    loratadine (CLARITIN) 10 MG tablet, Take 1 tablet by mouth daily, Disp: , Rfl:     L-Lysine 1000 MG TABS, 1,000 mg, Disp: , Rfl:     Magnesium Oxide (MAGNESIUM-OXIDE) 250 MG TABS tablet, Take 1 tablet by mouth 2 times daily, Disp: , Rfl:     diphenhydrAMINE (BENADRYL) 25 MG capsule, 1 capsule nightly as needed, Disp: , Rfl:     clobetasol (TEMOVATE) 0.05 % cream, Apply topically 2 times daily,

## 2023-10-19 ENCOUNTER — OFFICE VISIT (OUTPATIENT)
Age: 58
End: 2023-10-19

## 2023-10-19 VITALS — WEIGHT: 156 LBS | BODY MASS INDEX: 23.64 KG/M2 | HEIGHT: 68 IN

## 2023-10-19 DIAGNOSIS — M75.121 COMPLETE TEAR OF RIGHT ROTATOR CUFF, UNSPECIFIED WHETHER TRAUMATIC: Primary | ICD-10-CM

## 2023-10-19 PROCEDURE — 99024 POSTOP FOLLOW-UP VISIT: CPT | Performed by: ORTHOPAEDIC SURGERY

## 2023-10-19 NOTE — PROGRESS NOTES
[place] [address] [facility] MEDICINE  [address] [facility] [address]  [place] [address]  Dept: [phone]  Dept Fax: [phone]    Chief Compliant:  Chief Complaint   Patient presents with    Post-Op Check     Right shoulder sx f/u        History of Present Illness: This is a pleasant 66 y.o. female who is now 6 weeks status post right shoulder arthroscopic supraspinatus repair, labral debridement, subacromial bursectomy. She is doing well. Pain is minimal.  Taking Tylenol. She has been in therapy doing passive range of motion exercises. Physical Exam: The right shoulder incisions are well-healed. Passively she has full forward elevation. Actively she has 45 degrees of active elevation. Imaging: None      Assessment and Plan: This is a pleasant 66 y.o. female who is status post the above. She is progressing as expected. Continued to progress in therapy and start active assisted and active range of motion. I will see her back in 6 weeks.          Past History:    Current Outpatient Medications:     ibuprofen (ADVIL;MOTRIN) 800 MG tablet, Take 1 tablet by mouth 3 times daily (with meals), Disp: 90 tablet, Rfl: 0    loratadine (CLARITIN) 10 MG tablet, Take 1 tablet by mouth daily, Disp: , Rfl:     L-Lysine 1000 MG TABS, 1,000 mg, Disp: , Rfl:     Magnesium Oxide (MAGNESIUM-OXIDE) 250 MG TABS tablet, Take 1 tablet by mouth 2 times daily, Disp: , Rfl:     diphenhydrAMINE (BENADRYL) 25 MG capsule, 1 capsule nightly as needed, Disp: , Rfl:     clobetasol (TEMOVATE) 0.05 % cream, Apply topically 2 times daily, Disp: , Rfl:     Multiple Vitamins-Minerals (HAIR SKIN AND NAILS FORMULA PO), Take 1 tablet by mouth daily, Disp: , Rfl:     Omega-3 Fatty Acids (FISH OIL OMEGA-3 PO), Take by mouth, Disp: , Rfl:   Allergies   Allergen Reactions    Cucumber Extract Anaphylaxis    Pineapple Anaphylaxis

## 2023-11-28 ENCOUNTER — OFFICE VISIT (OUTPATIENT)
Age: 58
End: 2023-11-28

## 2023-11-28 DIAGNOSIS — M75.121 COMPLETE TEAR OF RIGHT ROTATOR CUFF, UNSPECIFIED WHETHER TRAUMATIC: Primary | ICD-10-CM

## 2023-11-28 PROCEDURE — 99024 POSTOP FOLLOW-UP VISIT: CPT | Performed by: ORTHOPAEDIC SURGERY

## 2023-11-28 NOTE — PROGRESS NOTES
[place] [address] [facility]TS MEDICINE  [facility] [address] [facility] [address]  [place] [address]  Dept: [phone]  Dept Fax: [phone]    Chief Compliant:  Chief Complaint   Patient presents with    Post-Op Check     Right shoulder        History of Present Illness: This is a pleasant 66 y.o. female who is now 3-month status post right shoulder arthroscopic supraspinatus repair, labral debridement, subacromial bursectomy. She is doing quite well. She has progressed quite a bit in therapy. She has no complaints. She has gone back to doing Pilates. Physical Exam: The right shoulder has full active forward elevation, full abduction, almost full internal rotation    Imaging: None      Assessment and Plan: This is a pleasant 66 y.o. female who is status post the above. She is doing excellent. We discussed long-term expectations and long-term working on return of strength. She can increase activity as tolerated and follow-up as needed.          Past History:    Current Outpatient Medications:     ibuprofen (ADVIL;MOTRIN) 800 MG tablet, Take 1 tablet by mouth 3 times daily (with meals), Disp: 90 tablet, Rfl: 0    loratadine (CLARITIN) 10 MG tablet, Take 1 tablet by mouth daily, Disp: , Rfl:     L-Lysine 1000 MG TABS, 1,000 mg, Disp: , Rfl:     Magnesium Oxide (MAGNESIUM-OXIDE) 250 MG TABS tablet, Take 1 tablet by mouth 2 times daily, Disp: , Rfl:     diphenhydrAMINE (BENADRYL) 25 MG capsule, 1 capsule nightly as needed, Disp: , Rfl:     clobetasol (TEMOVATE) 0.05 % cream, Apply topically 2 times daily, Disp: , Rfl:     Multiple Vitamins-Minerals (HAIR SKIN AND NAILS FORMULA PO), Take 1 tablet by mouth daily, Disp: , Rfl:     Omega-3 Fatty Acids (FISH OIL OMEGA-3 PO), Take by mouth, Disp: , Rfl:   Allergies   Allergen Reactions    Cucumber Extract Anaphylaxis    Pineapple Anaphylaxis    Nickel Rash

## 2024-05-08 ENCOUNTER — OFFICE VISIT (OUTPATIENT)
Dept: PRIMARY CARE CLINIC | Age: 59
End: 2024-05-08

## 2024-05-08 VITALS
OXYGEN SATURATION: 99 % | HEART RATE: 76 BPM | DIASTOLIC BLOOD PRESSURE: 68 MMHG | WEIGHT: 159 LBS | SYSTOLIC BLOOD PRESSURE: 100 MMHG | BODY MASS INDEX: 24.18 KG/M2 | TEMPERATURE: 97.8 F

## 2024-05-08 DIAGNOSIS — L25.5 DERMATITIS DUE TO PLANTS, INCLUDING POISON IVY, SUMAC, AND OAK: Primary | ICD-10-CM

## 2024-05-08 DIAGNOSIS — T63.301A SPIDER BITE WOUND, ACCIDENTAL OR UNINTENTIONAL, INITIAL ENCOUNTER: ICD-10-CM

## 2024-05-08 RX ORDER — MELATONIN 10 MG
10 CAPSULE ORAL NIGHTLY
COMMUNITY

## 2024-05-08 RX ORDER — CEPHALEXIN 500 MG/1
500 CAPSULE ORAL 4 TIMES DAILY
Qty: 28 CAPSULE | Refills: 0 | Status: SHIPPED | OUTPATIENT
Start: 2024-05-08 | End: 2024-05-15

## 2024-05-08 RX ORDER — FAMOTIDINE 20 MG/1
20 TABLET, FILM COATED ORAL 2 TIMES DAILY
Qty: 60 TABLET | Refills: 0 | Status: SHIPPED | OUTPATIENT
Start: 2024-05-08

## 2024-05-08 RX ORDER — METHYLPREDNISOLONE ACETATE 80 MG/ML
80 INJECTION, SUSPENSION INTRA-ARTICULAR; INTRALESIONAL; INTRAMUSCULAR; SOFT TISSUE ONCE
Status: COMPLETED | OUTPATIENT
Start: 2024-05-08 | End: 2024-05-08

## 2024-05-08 RX ADMIN — METHYLPREDNISOLONE ACETATE 80 MG: 80 INJECTION, SUSPENSION INTRA-ARTICULAR; INTRALESIONAL; INTRAMUSCULAR; SOFT TISSUE at 09:54

## 2024-05-08 NOTE — PROGRESS NOTES
5/8/24 at 1000  2. Spider bite wound, accidental or unintentional, initial encounter  -     cephALEXin (KEFLEX) 500 MG capsule; Take 1 capsule by mouth 4 times daily for 7 days, Disp-28 capsule, R-0Normal       :      Continue with benadryl and Claritin. Start Pepcid.  Steroid injection in office today.   Keflex for ankle bug bite.   Follow up in office with worsening symptoms.     No follow-ups on file.    Orders Placed This Encounter   Medications    famotidine (PEPCID) 20 MG tablet     Sig: Take 1 tablet by mouth 2 times daily     Dispense:  60 tablet     Refill:  0    methylPREDNISolone acetate (DEPO-MEDROL) injection 80 mg    cephALEXin (KEFLEX) 500 MG capsule     Sig: Take 1 capsule by mouth 4 times daily for 7 days     Dispense:  28 capsule     Refill:  0             Patient and/or parent given educational materials - see patient instructions.  Discussed use, benefit, and side effects of prescribed medications.  All patient questions answered.  Patient and/or parent voiced understanding.      Electronically signed by HOMER Fofana 5/8/2024 at 10:16 AM

## 2024-05-14 ENCOUNTER — OFFICE VISIT (OUTPATIENT)
Dept: PRIMARY CARE CLINIC | Age: 59
End: 2024-05-14
Payer: COMMERCIAL

## 2024-05-14 VITALS
HEART RATE: 56 BPM | DIASTOLIC BLOOD PRESSURE: 72 MMHG | SYSTOLIC BLOOD PRESSURE: 102 MMHG | WEIGHT: 158 LBS | BODY MASS INDEX: 24.02 KG/M2 | OXYGEN SATURATION: 98 % | TEMPERATURE: 97.4 F

## 2024-05-14 DIAGNOSIS — L30.9 DERMATITIS: Primary | ICD-10-CM

## 2024-05-14 PROCEDURE — 99213 OFFICE O/P EST LOW 20 MIN: CPT | Performed by: PHYSICIAN ASSISTANT

## 2024-05-14 PROCEDURE — 96372 THER/PROPH/DIAG INJ SC/IM: CPT | Performed by: PHYSICIAN ASSISTANT

## 2024-05-14 RX ORDER — TRIAMCINOLONE ACETONIDE 40 MG/ML
40 INJECTION, SUSPENSION INTRA-ARTICULAR; INTRAMUSCULAR ONCE
Status: COMPLETED | OUTPATIENT
Start: 2024-05-14 | End: 2024-05-14

## 2024-05-14 RX ORDER — PREDNISONE 10 MG/1
TABLET ORAL
Qty: 30 TABLET | Refills: 0 | Status: SHIPPED | OUTPATIENT
Start: 2024-05-15

## 2024-05-14 RX ORDER — TRIAMCINOLONE ACETONIDE 1 MG/G
CREAM TOPICAL
Qty: 45 G | Refills: 1 | Status: SHIPPED | OUTPATIENT
Start: 2024-05-14

## 2024-05-14 RX ORDER — HYDROXYZINE PAMOATE 25 MG/1
25 CAPSULE ORAL 3 TIMES DAILY PRN
Qty: 30 CAPSULE | Refills: 0 | Status: SHIPPED | OUTPATIENT
Start: 2024-05-14 | End: 2024-05-24

## 2024-05-14 RX ADMIN — TRIAMCINOLONE ACETONIDE 40 MG: 40 INJECTION, SUSPENSION INTRA-ARTICULAR; INTRAMUSCULAR at 11:52

## 2024-05-14 ASSESSMENT — ENCOUNTER SYMPTOMS
RESPIRATORY NEGATIVE: 1
GASTROINTESTINAL NEGATIVE: 1

## 2024-05-14 NOTE — PROGRESS NOTES
[facility]  [address] [place] [address]  Phone: [phone]  Fax: [phone]       [facility] WALK - [place]    Pt Name: Laura Lewis  MRN: [id number]  Birthdate 1965  Date of evaluation: 5/14/2024  Provider: Karma Dillon PA-C     CHIEF COMPLAINT       Chief Complaint   Patient presents with    Rash     Rash not improving from last visit on 5/8.            HISTORY OF PRESENT ILLNESS  (Location/Symptom, Timing/Onset, Context/Setting, Quality, Duration, Modifying Factors, Severity.)   Laura Lewis is a 59 y.o. White (non-) [1] female who presents to the office for evaluation of      Rash  This is a new problem. The problem has been waxing and waning since onset. The rash is diffuse. The rash is characterized by itchiness and redness. It is unknown if there was an exposure to a precipitant. Pertinent negatives include no fatigue or fever. Past treatments include oral steroids and antihistamine. The treatment provided mild relief.       Nursing Notes were reviewed.    REVIEW OF SYSTEMS    (2-9 systems for level 4, 10 or more for level 5)     Review of Systems   Constitutional:  Negative for diaphoresis, fatigue and fever.   HENT: Negative.     Respiratory: Negative.     Cardiovascular: Negative.    Gastrointestinal: Negative.    Genitourinary: Negative.    Musculoskeletal: Negative.    Skin:  Positive for rash.         Except as noted above the remainder of the review of systems was reviewed andnegative.       PAST MEDICAL HISTORY   History reviewed.    Past Medical History:   Diagnosis Date    Seasonal allergies     Shoulder pain          SURGICAL HISTORY     History reviewed.    Past Surgical History:   Procedure Laterality Date    BACK SURGERY  01/15/2009    ENDOMETRIAL ABLATION  07/29/2013    EYE SURGERY  6/30/2020 7/21/2020    HYSTERECTOMY, TOTAL ABDOMINAL (CERVIX REMOVED)      SHOULDER ARTHROSCOPY Right 09/06/2023    RIGHT SHOULDER ARTHROSCOPY ROTATOR CUFF REPAIR,  LABRAL

## 2024-10-08 ENCOUNTER — OFFICE VISIT (OUTPATIENT)
Dept: PRIMARY CARE CLINIC | Age: 59
End: 2024-10-08
Payer: COMMERCIAL

## 2024-10-08 VITALS
SYSTOLIC BLOOD PRESSURE: 98 MMHG | OXYGEN SATURATION: 99 % | TEMPERATURE: 97.1 F | WEIGHT: 164 LBS | BODY MASS INDEX: 24.94 KG/M2 | HEART RATE: 64 BPM | DIASTOLIC BLOOD PRESSURE: 72 MMHG

## 2024-10-08 DIAGNOSIS — R31.9 HEMATURIA, UNSPECIFIED TYPE: ICD-10-CM

## 2024-10-08 DIAGNOSIS — N30.01 ACUTE CYSTITIS WITH HEMATURIA: Primary | ICD-10-CM

## 2024-10-08 DIAGNOSIS — R30.0 DYSURIA: ICD-10-CM

## 2024-10-08 LAB
BILIRUBIN, POC: NEGATIVE
BLOOD URINE, POC: ABNORMAL
CLARITY, POC: CLEAR
COLOR, POC: ABNORMAL
GLUCOSE URINE, POC: NEGATIVE MG/DL
KETONES, POC: NEGATIVE MG/DL
LEUKOCYTE EST, POC: ABNORMAL
NITRITE, POC: NEGATIVE
PH, POC: 6
PROTEIN, POC: ABNORMAL MG/DL
SPECIFIC GRAVITY, POC: 1.01
UROBILINOGEN, POC: 0.2 MG/DL

## 2024-10-08 PROCEDURE — 99214 OFFICE O/P EST MOD 30 MIN: CPT

## 2024-10-08 PROCEDURE — 81003 URINALYSIS AUTO W/O SCOPE: CPT

## 2024-10-08 RX ORDER — PHENAZOPYRIDINE HYDROCHLORIDE 200 MG/1
200 TABLET, FILM COATED ORAL 3 TIMES DAILY PRN
Qty: 9 TABLET | Refills: 0 | Status: SHIPPED | OUTPATIENT
Start: 2024-10-08 | End: 2024-10-11

## 2024-10-08 RX ORDER — NITROFURANTOIN 25; 75 MG/1; MG/1
100 CAPSULE ORAL 2 TIMES DAILY
Qty: 10 CAPSULE | Refills: 0 | Status: SHIPPED | OUTPATIENT
Start: 2024-10-08 | End: 2024-10-13

## 2024-10-08 ASSESSMENT — ENCOUNTER SYMPTOMS
VOMITING: 0
NAUSEA: 0

## 2024-10-08 NOTE — PROGRESS NOTES
Laura Lewis (:  1965) is a 59 y.o. female,Established patient, here for evaluation of the following chief complaint(s):  Dysuria (Dysuria & lower abdominal pain for 10 days.)         Assessment & Plan  Acute cystitis with hematuria       Orders:    nitrofurantoin, macrocrystal-monohydrate, (MACROBID) 100 MG capsule; Take 1 capsule by mouth 2 times daily for 5 days    phenazopyridine (PYRIDIUM) 200 MG tablet; Take 1 tablet by mouth 3 times daily as needed for Pain    Dysuria       Orders:    POCT Urinalysis No Micro (Auto)    Culture, Urine; Future    nitrofurantoin, macrocrystal-monohydrate, (MACROBID) 100 MG capsule; Take 1 capsule by mouth 2 times daily for 5 days    phenazopyridine (PYRIDIUM) 200 MG tablet; Take 1 tablet by mouth 3 times daily as needed for Pain    Hematuria, unspecified type       Orders:    Culture, Urine; Future    nitrofurantoin, macrocrystal-monohydrate, (MACROBID) 100 MG capsule; Take 1 capsule by mouth 2 times daily for 5 days    phenazopyridine (PYRIDIUM) 200 MG tablet; Take 1 tablet by mouth 3 times daily as needed for Pain    Advised to hydrate well  Start ABX and Pyridium as ordered   Alteration to POC pending culture     Return if symptoms worsen or fail to improve.       Subjective   Dysuria   This is a new problem. The current episode started 1 to 4 weeks ago (about 10 days ago). The problem occurs every urination. The problem has been gradually worsening. The quality of the pain is described as aching and burning. There has been no fever. She is Sexually active (No concern for STI). There is No history of pyelonephritis. Associated symptoms include chills, frequency and hematuria (only on POC). Pertinent negatives include no flank pain, nausea, urgency or vomiting. Treatments tried: Increased water and cranberry juice. The treatment provided no relief. There is no history of catheterization, kidney stones, recurrent UTIs or a urological procedure.       Review of

## 2024-10-11 DIAGNOSIS — R30.0 DYSURIA: ICD-10-CM

## 2024-10-11 DIAGNOSIS — R31.9 HEMATURIA, UNSPECIFIED TYPE: ICD-10-CM

## 2024-10-14 ENCOUNTER — TELEPHONE (OUTPATIENT)
Dept: FAMILY MEDICINE CLINIC | Age: 59
End: 2024-10-14

## 2024-10-14 DIAGNOSIS — R30.0 DYSURIA: ICD-10-CM

## 2024-10-14 DIAGNOSIS — N30.01 ACUTE CYSTITIS WITH HEMATURIA: ICD-10-CM

## 2024-10-14 DIAGNOSIS — R31.9 HEMATURIA, UNSPECIFIED TYPE: ICD-10-CM

## 2024-10-14 RX ORDER — NITROFURANTOIN 25; 75 MG/1; MG/1
100 CAPSULE ORAL 2 TIMES DAILY
Qty: 10 CAPSULE | Refills: 0 | Status: SHIPPED | OUTPATIENT
Start: 2024-10-14 | End: 2024-10-19

## 2024-10-14 NOTE — TELEPHONE ENCOUNTER
Urine culture never resulted?  Please inquire that her symptoms improved at all while on abx?      If urine culture was not sent, please advise patient to stop in so we can send another urine.      I will send Macrobid again to start in the mean time.

## 2024-10-14 NOTE — TELEPHONE ENCOUNTER
Patient stated she is all done with the antibiotics and today she is now having burning again while urinating, urgency,

## 2024-10-14 NOTE — TELEPHONE ENCOUNTER
Culture results found.  Antibiotic appropriate for E Coli bacteria.  Complete additional 5 day of treatment. Follow up PRN.

## 2025-01-08 ENCOUNTER — OFFICE VISIT (OUTPATIENT)
Dept: PRIMARY CARE CLINIC | Age: 60
End: 2025-01-08
Payer: COMMERCIAL

## 2025-01-08 VITALS
TEMPERATURE: 98.6 F | OXYGEN SATURATION: 97 % | SYSTOLIC BLOOD PRESSURE: 106 MMHG | WEIGHT: 163 LBS | HEART RATE: 70 BPM | DIASTOLIC BLOOD PRESSURE: 76 MMHG | BODY MASS INDEX: 24.78 KG/M2

## 2025-01-08 DIAGNOSIS — J01.90 ACUTE SINUSITIS, RECURRENCE NOT SPECIFIED, UNSPECIFIED LOCATION: Primary | ICD-10-CM

## 2025-01-08 DIAGNOSIS — R68.83 CHILLS: ICD-10-CM

## 2025-01-08 DIAGNOSIS — R09.81 NASAL CONGESTION: ICD-10-CM

## 2025-01-08 LAB
INFLUENZA A ANTIGEN, POC: NEGATIVE
INFLUENZA B ANTIGEN, POC: NEGATIVE
LOT EXPIRE DATE: NORMAL
LOT KIT NUMBER: NORMAL
SARS-COV-2, POC: NORMAL
VALID INTERNAL CONTROL: NORMAL
VENDOR AND KIT NAME POC: NORMAL

## 2025-01-08 PROCEDURE — 99213 OFFICE O/P EST LOW 20 MIN: CPT | Performed by: PHYSICIAN ASSISTANT

## 2025-01-08 PROCEDURE — 87428 SARSCOV & INF VIR A&B AG IA: CPT | Performed by: PHYSICIAN ASSISTANT

## 2025-01-08 RX ORDER — PREDNISONE 20 MG/1
20 TABLET ORAL 2 TIMES DAILY
Qty: 10 TABLET | Refills: 0 | Status: SHIPPED | OUTPATIENT
Start: 2025-01-08 | End: 2025-01-13

## 2025-01-08 ASSESSMENT — ENCOUNTER SYMPTOMS
SINUS PRESSURE: 1
SORE THROAT: 1
COUGH: 1
GASTROINTESTINAL NEGATIVE: 1
SINUS PAIN: 1

## 2025-01-08 NOTE — PROGRESS NOTES
[facility] In  [address] [place] [id number]  Phone: [phone]  Fax: [phone]       [facility] WALK - [place]    Pt Name: Laura Lewis  MRN: [id number]  Birthdate 1965  Date of evaluation: 1/8/2025  Provider: Karma Dillon PA-C     CHIEF COMPLAINT       Chief Complaint   Patient presents with    Congestion     Nasal congestion/drainage, headaches, and chills           HISTORY OF PRESENT ILLNESS  (Location/Symptom, Timing/Onset, Context/Setting, Quality, Duration, Modifying Factors, Severity.)   Laura Lewis is a 59 y.o. White (non-) [1] female who presents to the office for evaluation of      HPI    Nursing Notes were reviewed.    REVIEW OF SYSTEMS    (2-9 systems for level 4, 10 or more for level 5)     Review of Systems      Except as noted above the remainder of the review of systems was reviewed andnegative.       PAST MEDICAL HISTORY   History reviewed.    Past Medical History:   Diagnosis Date    Seasonal allergies     Shoulder pain          SURGICAL HISTORY     History reviewed.    Past Surgical History:   Procedure Laterality Date    BACK SURGERY  01/15/2009    ENDOMETRIAL ABLATION  07/29/2013    EYE SURGERY  6/30/2020 7/21/2020    HYSTERECTOMY, TOTAL ABDOMINAL (CERVIX REMOVED)      SHOULDER ARTHROSCOPY Right 09/06/2023    RIGHT SHOULDER ARTHROSCOPY ROTATOR CUFF REPAIR,  LABRAL DEBRIDEMENT, AND SUBACROMIAL DECOMPRESSION WITH ARTHREX AND PRE-OP REGIONAL BLOCK    SHOULDER ARTHROSCOPY Right 9/6/2023    RIGHT SHOULDER ARTHROSCOPY ROTATOR CUFF REPAIR,  LABRAL DEBRIDEMENT, AND SUBACROMIAL DECOMPRESSION WITH ARTHREX AND PRE-OP REGIONAL BLOCK performed by Seun Bethea MD at [facility] OR    TUBAL LIGATION  12/23/2014         CURRENT MEDICATIONS       Current Outpatient Medications   Medication Sig Dispense Refill    predniSONE (DELTASONE) 20 MG tablet Take 1 tablet by mouth 2 times daily for 5 days 10 tablet 0    amoxicillin-clavulanate (AUGMENTIN) 875-125 MG per

## 2025-01-08 NOTE — PROGRESS NOTES
[facility] In  [address] [place] [id number]  Phone: [phone]  Fax: [phone]       [facility] WALK - IN    Pt Name: Laura Lewis  MRN: [id number]  Birthdate 1965  Date of evaluation: 1/8/2025  Provider: Karma Dillon PA-C     CHIEF COMPLAINT       Chief Complaint   Patient presents with    Congestion     Nasal congestion/drainage, headaches, and chills           HISTORY OF PRESENT ILLNESS  (Location/Symptom, Timing/Onset, Context/Setting, Quality, Duration, Modifying Factors, Severity.)   Laura Lewis is a 59 y.o. White (non-) [1] female who presents to the office for evaluation of      Sinusitis  This is a new problem. The current episode started 1 to 4 weeks ago. There has been no fever. Associated symptoms include chills, congestion, coughing, headaches, sinus pressure and a sore throat. Pertinent negatives include no ear pain or sneezing. (Ear fullness) Past treatments include oral decongestants and acetaminophen (anhistamines).       Nursing Notes were reviewed.    REVIEW OF SYSTEMS    (2-9 systems for level 4, 10 or more for level 5)     Review of Systems   Constitutional:  Positive for chills and fatigue. Negative for fever.   HENT:  Positive for congestion, postnasal drip, sinus pressure, sinus pain and sore throat. Negative for ear discharge, ear pain and sneezing.    Respiratory:  Positive for cough.    Gastrointestinal: Negative.    Neurological:  Positive for headaches.         Except as noted above the remainder of the review of systems was reviewed andnegative.       PAST MEDICAL HISTORY   History reviewed.    Past Medical History:   Diagnosis Date    Seasonal allergies     Shoulder pain          SURGICAL HISTORY     History reviewed.    Past Surgical History:   Procedure Laterality Date    BACK SURGERY  01/15/2009    ENDOMETRIAL ABLATION  07/29/2013    EYE SURGERY  6/30/2020 7/21/2020    HYSTERECTOMY, TOTAL ABDOMINAL (CERVIX REMOVED)      SHOULDER

## 2025-06-23 ENCOUNTER — TELEPHONE (OUTPATIENT)
Dept: FAMILY MEDICINE CLINIC | Age: 60
End: 2025-06-23

## 2025-06-23 NOTE — TELEPHONE ENCOUNTER
----- Message from Iris TADEO sent at 6/23/2025  1:24 PM EDT -----  Regarding: [facility] Appointment Request  [facility] Appointment Request    Patient needs appointment for [facility] Appointment Type: New to Provider.    Patient Requested Dates(s):as soon as possible, Monday Tuesday and Wednesday   Patient Requested Time: morning   Provider Name: Master Reyes MD    Reason for Appointment Request: Other requesting to change PCP due to insurance changes, annual wellness visit   --------------------------------------------------------------------------------------------------------------------------    Relationship to Patient: Self     Call Back Information: OK to leave message on voicemail  Preferred Call Back Number: Phone [phone]

## 2025-06-23 NOTE — TELEPHONE ENCOUNTER
Laura Lewis is requesting a refill on the following medication(s):  Requested Prescriptions      No prescriptions requested or ordered in this encounter       Last Visit Date (If Applicable):  8/14/2023    Next Visit Date:    7/2/2025

## 2025-06-23 NOTE — TELEPHONE ENCOUNTER
Called Pt. to set up a new patient appt and had to leave a message on her phone for her to call so that we could get her set up

## 2025-06-29 SDOH — HEALTH STABILITY: PHYSICAL HEALTH: ON AVERAGE, HOW MANY DAYS PER WEEK DO YOU ENGAGE IN MODERATE TO STRENUOUS EXERCISE (LIKE A BRISK WALK)?: 0 DAYS

## 2025-07-02 ENCOUNTER — OFFICE VISIT (OUTPATIENT)
Dept: FAMILY MEDICINE CLINIC | Age: 60
End: 2025-07-02
Payer: COMMERCIAL

## 2025-07-02 VITALS
OXYGEN SATURATION: 98 % | HEART RATE: 58 BPM | BODY MASS INDEX: 24.13 KG/M2 | SYSTOLIC BLOOD PRESSURE: 122 MMHG | DIASTOLIC BLOOD PRESSURE: 80 MMHG | HEIGHT: 68 IN | WEIGHT: 159.2 LBS

## 2025-07-02 DIAGNOSIS — Z13.6 ENCOUNTER FOR LIPID SCREENING FOR CARDIOVASCULAR DISEASE: ICD-10-CM

## 2025-07-02 DIAGNOSIS — L30.9 DERMATITIS: ICD-10-CM

## 2025-07-02 DIAGNOSIS — Z13.220 ENCOUNTER FOR LIPID SCREENING FOR CARDIOVASCULAR DISEASE: ICD-10-CM

## 2025-07-02 DIAGNOSIS — G89.29 OTHER CHRONIC PAIN: ICD-10-CM

## 2025-07-02 DIAGNOSIS — Z12.31 ENCOUNTER FOR SCREENING MAMMOGRAM FOR BREAST CANCER: ICD-10-CM

## 2025-07-02 DIAGNOSIS — Z00.00 ROUTINE GENERAL MEDICAL EXAMINATION AT A HEALTH CARE FACILITY: Primary | ICD-10-CM

## 2025-07-02 LAB
ALBUMIN/GLOBULIN RATIO: 1.5 G/DL
ALBUMIN: 4.3 G/DL (ref 3.5–5)
ALP BLD-CCNC: 71 UNITS/L (ref 38–126)
ALT SERPL-CCNC: 19 UNITS/L (ref 4–35)
ANION GAP SERPL CALCULATED.3IONS-SCNC: 6.1 MMOL/L (ref 3–11)
AST SERPL-CCNC: 31 UNITS/L (ref 14–36)
BASOPHILS ABSOLUTE: 0.05 X10E3/?L (ref 0–0.3)
BASOPHILS RELATIVE PERCENT: 0.86 % (ref 0–3)
BILIRUB SERPL-MCNC: 0.3 MG/DL (ref 0.2–1.3)
BUN BLDV-MCNC: 15 MG/DL (ref 7–17)
CALCIUM SERPL-MCNC: 11.1 MG/DL (ref 8.4–10.2)
CHLORIDE BLD-SCNC: 106 MMOL/L (ref 98–120)
CHOLESTEROL, TOTAL: 212 MG/DL (ref 50–200)
CHOLESTEROL/HDL RATIO: 3.16 RATIO (ref 0–4.5)
CO2: 28 MMOL/L (ref 22–31)
CREAT SERPL-MCNC: 0.8 MG/DL (ref 0.5–1)
EOSINOPHILS ABSOLUTE: 0.37 X10E3/?L (ref 0–1.1)
EOSINOPHILS RELATIVE PERCENT: 7.03 % (ref 0–10)
GFR, ESTIMATED: > 60
GLOBULIN: 2.8 G/DL
GLUCOSE: 69 MG/DL (ref 65–105)
HCT VFR BLD CALC: 41.9 % (ref 37–47)
HDLC SERPL-MCNC: 67 MG/DL (ref 36–68)
HEMOGLOBIN: 13.3 G/DL (ref 12–16)
LDL CHOLESTEROL: 112.6 MG/DL (ref 0–160)
LYMPHOCYTES ABSOLUTE: 1.7 X10E3/?L (ref 1–5.5)
LYMPHOCYTES RELATIVE PERCENT: 32.34 % (ref 20–51.1)
MCH RBC QN AUTO: 29.4 PG (ref 28.5–32.5)
MCHC RBC AUTO-ENTMCNC: 31.9 G/DL (ref 32–37)
MCV RBC AUTO: 92.2 FL (ref 80–94)
MONOCYTES ABSOLUTE: 0.46 X10E3/?L (ref 0.1–1)
MONOCYTES RELATIVE PERCENT: 8.8 % (ref 1.7–9.3)
NEUTROPHILS ABSOLUTE: 2.68 X10E3/?L (ref 2–8.1)
NEUTROPHILS RELATIVE PERCENT: 50.97 % (ref 42.2–75.2)
PDW BLD-RTO: 12.3 % (ref 8.5–15.5)
PLATELET # BLD: 296.2 THOU/MM3 (ref 130–400)
POTASSIUM SERPL-SCNC: 4.7 MMOL/L (ref 3.6–5)
RBC # BLD: 4.54 M/UL (ref 4.2–5.4)
SODIUM BLD-SCNC: 140 MMOL/L (ref 135–145)
TOTAL PROTEIN: 7.1 G/DL (ref 6.3–8.2)
TRIGL SERPL-MCNC: 162 MG/DL (ref 10–250)
VLDLC SERPL CALC-MCNC: 32 MG/DL (ref 0–50)
WBC # BLD: 5.3 THOU/ML3 (ref 4.8–10.8)

## 2025-07-02 PROCEDURE — 99396 PREV VISIT EST AGE 40-64: CPT | Performed by: STUDENT IN AN ORGANIZED HEALTH CARE EDUCATION/TRAINING PROGRAM

## 2025-07-02 RX ORDER — CLOTRIMAZOLE AND BETAMETHASONE DIPROPIONATE 10; .64 MG/G; MG/G
CREAM TOPICAL
Qty: 15 G | Refills: 0 | Status: SHIPPED | OUTPATIENT
Start: 2025-07-02

## 2025-07-02 RX ORDER — CELECOXIB 100 MG/1
100 CAPSULE ORAL 2 TIMES DAILY
Qty: 60 CAPSULE | Refills: 1 | Status: SHIPPED | OUTPATIENT
Start: 2025-07-02

## 2025-07-02 SDOH — ECONOMIC STABILITY: FOOD INSECURITY: WITHIN THE PAST 12 MONTHS, YOU WORRIED THAT YOUR FOOD WOULD RUN OUT BEFORE YOU GOT MONEY TO BUY MORE.: NEVER TRUE

## 2025-07-02 SDOH — ECONOMIC STABILITY: FOOD INSECURITY: WITHIN THE PAST 12 MONTHS, THE FOOD YOU BOUGHT JUST DIDN'T LAST AND YOU DIDN'T HAVE MONEY TO GET MORE.: NEVER TRUE

## 2025-07-02 ASSESSMENT — PATIENT HEALTH QUESTIONNAIRE - PHQ9
SUM OF ALL RESPONSES TO PHQ QUESTIONS 1-9: 0
1. LITTLE INTEREST OR PLEASURE IN DOING THINGS: NOT AT ALL
SUM OF ALL RESPONSES TO PHQ QUESTIONS 1-9: 0
2. FEELING DOWN, DEPRESSED OR HOPELESS: NOT AT ALL

## 2025-07-02 NOTE — PROGRESS NOTES
[address], [address]  [place] [address]  Phone: [phone]  Fax: [phone]      Date of Visit:  25  Patient Name: Laura Lewis   Patient :  1965     ASSESSMENT/PLAN     1. Routine general medical examination at a health care facility  2. Encounter for screening mammogram for breast cancer  -     [facility] LEE DIGITAL SCREEN BILATERAL; Future  3. Other chronic pain  -     celecoxib (CELEBREX) 100 MG capsule; Take 1 capsule by mouth 2 times daily, Disp-60 capsule, R-1Normal  4. Encounter for lipid screening for cardiovascular disease  5. Dermatitis  -     clotrimazole-betamethasone (LOTRISONE) 1-0.05 % cream; Apply topically 2 times daily., Disp-15 g, R-0, Normal    Routine physical performed today.  Mammogram ordered.  Routine yearly lab work has been ordered.  Advised patient to try Celebrex as needed sparingly, history of GI upset with previous NSAIDs.  This may help her occasional pain without the same GI upset like symptoms.  No cardiac history.    Will try Lotrisone for dermatitis on forehead.  Patient is agreeable.    Follow up in 1 year    - Questions/concerns answered. Patient verbalized and expressed understanding. Medications, laboratory testing, imaging, consultation, and follow up as documented in this encounter.       HPI:     Laura Lewis is a 60 y.o. female with   Patient Active Problem List   Diagnosis    Allergic rhinitis     who presents today to discuss   Chief Complaint   Patient presents with    New Patient     Increased joint pain and skin issues  Bones are more prominent in both feet and hurts to wear shoes       HPI: Patient presents today as new patient to provider.  Would like to perform preventive physical today.  Has been having some increased occasional joint pain, this is not often, just wondering if there is anything she can take for occasional pain.  Has been having some redness to skin above her forehead

## 2025-07-06 ENCOUNTER — RESULTS FOLLOW-UP (OUTPATIENT)
Dept: FAMILY MEDICINE CLINIC | Age: 60
End: 2025-07-06

## 2025-07-06 DIAGNOSIS — E83.52 HYPERCALCEMIA: Primary | ICD-10-CM

## 2025-07-17 LAB — CALCIUM IONIZED: 5.8 MG/DL (ref 4.5–5.6)

## 2025-07-30 ENCOUNTER — HOSPITAL ENCOUNTER (OUTPATIENT)
Age: 60
Setting detail: SPECIMEN
Discharge: HOME | End: 2025-07-30
Payer: COMMERCIAL

## 2025-07-30 DIAGNOSIS — E83.52 HYPERCALCEMIA: ICD-10-CM

## 2025-07-30 PROCEDURE — 83970 ASSAY OF PARATHORMONE: CPT

## 2025-07-30 PROCEDURE — 82330 ASSAY OF CALCIUM: CPT

## 2025-07-31 LAB
CA-I BLD-SCNC: 1.43 MMOL/L (ref 1.13–1.33)
PTH-INTACT SERPL-MCNC: 69 PG/ML (ref 17.9–58.6)

## 2025-08-12 ENCOUNTER — TELEPHONE (OUTPATIENT)
Dept: FAMILY MEDICINE CLINIC | Age: 60
End: 2025-08-12

## 2025-09-01 LAB — VITAMIN D 25-HYDROXY: 33.6 NG/ML (ref 30–100)

## 2025-09-03 LAB
CALCIUM 24 HOUR URINE: 242 MG/24 HR (ref 0–320)
CALCIUM, URINE: 18.6 MG/DL
CREAT SERPL-MCNC: 0.84 MG/DL (ref 0.57–1)
CREATININE 24 HOUR URINE: 1000 MG/24 HR (ref 800–1800)
CREATININE CLEARANCE: 83 ML/MIN (ref 88–128)
CREATININE URINE: 76.9 MG/DL
EGFR (CKD-EPI): 80

## (undated) DEVICE — PROTECTOR ULN NRV PUR FOAM HK LOOP STRP ANATOMICALLY

## (undated) DEVICE — DRAPE,U/ SHT,SPLIT,PLAS,STERIL: Brand: MEDLINE

## (undated) DEVICE — SUTURE PDS II SZ 0 L36IN ABSRB VLT L36MM CT-1 1/2 CIR Z346H

## (undated) DEVICE — STRAP,POSITIONING,KNEE/BODY,FOAM,4X60": Brand: MEDLINE

## (undated) DEVICE — ASPIRATOR FLR L72IN SUCT TB W/ 1 DETACH FISH STK PUSH HNDL

## (undated) DEVICE — Device

## (undated) DEVICE — SOLUTION IRRIG 3000ML 0.9% SOD CHL USP UROMATIC PLAS CONT

## (undated) DEVICE — DRAPE,REIN 53X77,STERILE: Brand: MEDLINE

## (undated) DEVICE — KIT CHAIR TRIMANO FOAM W/ SUPP ARM DRP ERGONOMICALLY DESIGNED

## (undated) DEVICE — BLADE SHV L13CM DIA4MM DISECT AGG COOLCUT

## (undated) DEVICE — SOLUTION IRRIG 1000ML 0.9% SOD CHL USP POUR PLAS BTL

## (undated) DEVICE — IMMOBILIZER SHLDR L10.5-17IN D7IN SLNG W/ 15DEG ABD PLLW

## (undated) DEVICE — CANN 5.5 WO/HOLES LTX FREE ORANGE

## (undated) DEVICE — SUTURE MCRYL SZ 3-0 L27IN ABSRB UD L19MM PS-2 3/8 CIR PRIM Y427H

## (undated) DEVICE — NEEDLE SUT PASS FOR ROT CUF LABRAL REP MULTFI SCORPION

## (undated) DEVICE — MHPB ARTHROSCOPY PACK: Brand: MEDLINE INDUSTRIES, INC.

## (undated) DEVICE — WIPES SKIN CLOTH READYPREP 9 X 10.5 IN 2% CHLORHEX GLUCONATE CHG PREOP

## (undated) DEVICE — SUTURE FIBERTAPE FIBERWIRE SZ 2-0 30IN NONABSORB BLU AR72377

## (undated) DEVICE — TUBING, SUCTION, 9/32" X 20', STRAIGHT: Brand: MEDLINE INDUSTRIES, INC.

## (undated) DEVICE — BLANKET WRM W40.2XL55.9IN IORT LO BODY + MISTRAL AIR

## (undated) DEVICE — APPLICATOR MEDICATED 26 CC SOLUTION HI LT ORNG CHLORAPREP

## (undated) DEVICE — POUCH, INSTRUMENT, 3POCKET, INVISISHIELD: Brand: MEDLINE

## (undated) DEVICE — DRAPE,SHOULDER,BEACH CHAIR,STERILE: Brand: MEDLINE

## (undated) DEVICE — COVER,MAYO STAND,STERILE: Brand: MEDLINE

## (undated) DEVICE — TUBING PMP L16FT MAIN DISP FOR AR-6400 AR-6475

## (undated) DEVICE — PROBE ABLAT XL 90DEG ASPIR BPLR RF 1 PC ELECTRD ERGO HNDL

## (undated) DEVICE — GLOVE ORANGE PI 8   MSG9080

## (undated) DEVICE — CANNULA THREADED DISP 8.5 X 72MM: Brand: CLEAR-TRAC

## (undated) DEVICE — SUTURE SUTTAPE FIBERLINK 1.3MM WHT BLU CLS LOOP AR7535